# Patient Record
Sex: FEMALE | Race: WHITE | NOT HISPANIC OR LATINO | Employment: OTHER | URBAN - NONMETROPOLITAN AREA
[De-identification: names, ages, dates, MRNs, and addresses within clinical notes are randomized per-mention and may not be internally consistent; named-entity substitution may affect disease eponyms.]

---

## 2023-08-17 NOTE — PROGRESS NOTES
Krystin Mendoza is a 68 y.o. female who presents for Establish Care (No concerns. )    She is dealing with burping for 4 month. No nausea, vomiting. No diarrhea or constipation.     Left shoulder arthritis: Following with Dr. Gilman. Doing PT, did well with an injection.     Review of systems completed and unremarkable other than what is documented in HPI.    Social history: non-smoker, she drinks a glass of red wine per day, she is retired from work at a title  Medical history: weight loss  Medications: None, vitamins  SurgHx: h/o surgery for inguinal hernia and bowel obstruction (Dr. Hinton 2021), h/o left wrist surgery (fracture)  Fhx: mom had DM  Allergies: PCN    Objective   There were no vitals taken for this visit.    Gen: No acute distress, alert and oriented x3, pleasant   HEENT: moist mucous membranes, b/l external auditory canals are clear of debris, TMs within normal limits, no oropharyngeal lesions, eomi, perrla   Neck: thyroid within normal limits, no lymphadenopathy   CV: RRR, normal S1/S2, no murmur   Resp: Clear to auscultation bilaterally, no wheezes or rhonchi appreciated  Abd: soft, nontender, non-distended, no guarding/rigidity, bowel sounds present  Extr: no edema, no calf tenderness  Derm: Skin is warm and dry, no rashes appreciated  Psych: mood is good, affect is congruent, good hygiene, normal speech and eye contact  Neuro: cranial nerves grossly intact, normal gait      Assessment/Plan     69yo female here to establish care:    #Unexplained weight loss  Likely due to stress which has improved  Regaining weight  Monitor    #Shoulder pain  Following with Mukul   Doing well since injection    #Burping  Working on changing her calcium supplement  Recent ct chest negative for hiatal hernia  Discussed warning symptoms for GERD and she is declining currently    HCM:  Mammogram June negative  UTD for PNA, shingles,COVID and flu  Last C-scope 2015, discuss at followup

## 2023-08-18 PROBLEM — I10 ESSENTIAL (PRIMARY) HYPERTENSION: Status: ACTIVE | Noted: 2022-03-12

## 2023-08-18 PROBLEM — F41.9 ANXIETY DISORDER, UNSPECIFIED: Status: ACTIVE | Noted: 2023-01-09

## 2023-08-18 PROBLEM — E03.9 HYPOTHYROIDISM, UNSPECIFIED: Status: ACTIVE | Noted: 2023-02-26

## 2023-08-18 PROBLEM — K46.9 ABDOMINAL HERNIA: Status: ACTIVE | Noted: 2022-03-12

## 2023-08-24 RX ORDER — MULTIVITAMIN
TABLET ORAL
COMMUNITY

## 2023-08-24 RX ORDER — BISACODYL 5 MG/1
1 TABLET, COATED ORAL
COMMUNITY

## 2023-08-24 RX ORDER — SERTRALINE HYDROCHLORIDE 25 MG/1
1 TABLET, FILM COATED ORAL DAILY
COMMUNITY
End: 2023-08-25 | Stop reason: ALTCHOICE

## 2023-08-25 ENCOUNTER — OFFICE VISIT (OUTPATIENT)
Dept: PRIMARY CARE | Facility: CLINIC | Age: 68
End: 2023-08-25
Payer: MEDICARE

## 2023-08-25 VITALS
DIASTOLIC BLOOD PRESSURE: 82 MMHG | BODY MASS INDEX: 17.38 KG/M2 | HEIGHT: 64 IN | HEART RATE: 79 BPM | SYSTOLIC BLOOD PRESSURE: 140 MMHG | WEIGHT: 101.8 LBS

## 2023-08-25 DIAGNOSIS — R73.09 ELEVATED GLUCOSE: Primary | ICD-10-CM

## 2023-08-25 DIAGNOSIS — Z13.220 SCREENING FOR HYPERLIPIDEMIA: ICD-10-CM

## 2023-08-25 PROCEDURE — 3077F SYST BP >= 140 MM HG: CPT | Performed by: FAMILY MEDICINE

## 2023-08-25 PROCEDURE — 99204 OFFICE O/P NEW MOD 45 MIN: CPT | Performed by: FAMILY MEDICINE

## 2023-08-25 PROCEDURE — 3079F DIAST BP 80-89 MM HG: CPT | Performed by: FAMILY MEDICINE

## 2023-08-25 PROCEDURE — 1036F TOBACCO NON-USER: CPT | Performed by: FAMILY MEDICINE

## 2023-08-25 PROCEDURE — 1159F MED LIST DOCD IN RCRD: CPT | Performed by: FAMILY MEDICINE

## 2023-08-25 PROCEDURE — 3008F BODY MASS INDEX DOCD: CPT | Performed by: FAMILY MEDICINE

## 2023-08-25 RX ORDER — GARLIC 1000 MG
CAPSULE ORAL
COMMUNITY

## 2023-08-25 RX ORDER — ASPIRIN 81 MG/1
81 TABLET ORAL DAILY
COMMUNITY

## 2023-10-20 ENCOUNTER — APPOINTMENT (OUTPATIENT)
Dept: RADIOLOGY | Facility: HOSPITAL | Age: 68
End: 2023-10-20
Payer: MEDICARE

## 2023-10-20 ENCOUNTER — HOSPITAL ENCOUNTER (EMERGENCY)
Facility: HOSPITAL | Age: 68
Discharge: HOME | End: 2023-10-20
Attending: EMERGENCY MEDICINE
Payer: MEDICARE

## 2023-10-20 VITALS
TEMPERATURE: 98.6 F | HEART RATE: 78 BPM | DIASTOLIC BLOOD PRESSURE: 79 MMHG | OXYGEN SATURATION: 98 % | WEIGHT: 105.82 LBS | SYSTOLIC BLOOD PRESSURE: 164 MMHG | RESPIRATION RATE: 18 BRPM | BODY MASS INDEX: 18.07 KG/M2 | HEIGHT: 64 IN

## 2023-10-20 DIAGNOSIS — R10.32 LEFT LOWER QUADRANT ABDOMINAL PAIN: ICD-10-CM

## 2023-10-20 DIAGNOSIS — E87.1 HYPONATREMIA: Primary | ICD-10-CM

## 2023-10-20 LAB
ALBUMIN SERPL BCP-MCNC: 4.8 G/DL (ref 3.4–5)
ALP SERPL-CCNC: 73 U/L (ref 33–136)
ALT SERPL W P-5'-P-CCNC: 15 U/L (ref 7–45)
ANION GAP SERPL CALC-SCNC: 13 MMOL/L (ref 10–20)
APPEARANCE UR: CLEAR
AST SERPL W P-5'-P-CCNC: 26 U/L (ref 9–39)
BASOPHILS # BLD AUTO: 0.04 X10*3/UL (ref 0–0.1)
BASOPHILS NFR BLD AUTO: 0.5 %
BILIRUB SERPL-MCNC: 0.5 MG/DL (ref 0–1.2)
BILIRUB UR STRIP.AUTO-MCNC: NEGATIVE MG/DL
BUN SERPL-MCNC: 7 MG/DL (ref 6–23)
CALCIUM SERPL-MCNC: 9.8 MG/DL (ref 8.6–10.3)
CHLORIDE SERPL-SCNC: 93 MMOL/L (ref 98–107)
CO2 SERPL-SCNC: 26 MMOL/L (ref 21–32)
COLOR UR: ABNORMAL
CREAT SERPL-MCNC: 0.62 MG/DL (ref 0.5–1.05)
EOSINOPHIL # BLD AUTO: 0.05 X10*3/UL (ref 0–0.7)
EOSINOPHIL NFR BLD AUTO: 0.7 %
ERYTHROCYTE [DISTWIDTH] IN BLOOD BY AUTOMATED COUNT: 12.4 % (ref 11.5–14.5)
GFR SERPL CREATININE-BSD FRML MDRD: >90 ML/MIN/1.73M*2
GLUCOSE SERPL-MCNC: 114 MG/DL (ref 74–99)
GLUCOSE UR STRIP.AUTO-MCNC: NEGATIVE MG/DL
HCT VFR BLD AUTO: 46 % (ref 36–46)
HGB BLD-MCNC: 15.4 G/DL (ref 12–16)
HOLD SPECIMEN: NORMAL
IMM GRANULOCYTES # BLD AUTO: 0.03 X10*3/UL (ref 0–0.7)
IMM GRANULOCYTES NFR BLD AUTO: 0.4 % (ref 0–0.9)
KETONES UR STRIP.AUTO-MCNC: NEGATIVE MG/DL
LEUKOCYTE ESTERASE UR QL STRIP.AUTO: NEGATIVE
LYMPHOCYTES # BLD AUTO: 1.48 X10*3/UL (ref 1.2–4.8)
LYMPHOCYTES NFR BLD AUTO: 19.3 %
MAGNESIUM SERPL-MCNC: 2.16 MG/DL (ref 1.6–2.4)
MCH RBC QN AUTO: 31.6 PG (ref 26–34)
MCHC RBC AUTO-ENTMCNC: 33.5 G/DL (ref 32–36)
MCV RBC AUTO: 95 FL (ref 80–100)
MONOCYTES # BLD AUTO: 0.82 X10*3/UL (ref 0.1–1)
MONOCYTES NFR BLD AUTO: 10.7 %
NEUTROPHILS # BLD AUTO: 5.25 X10*3/UL (ref 1.2–7.7)
NEUTROPHILS NFR BLD AUTO: 68.4 %
NITRITE UR QL STRIP.AUTO: NEGATIVE
NRBC BLD-RTO: 0 /100 WBCS (ref 0–0)
PH UR STRIP.AUTO: 7 [PH]
PLATELET # BLD AUTO: 243 X10*3/UL (ref 150–450)
PMV BLD AUTO: 8.5 FL (ref 7.5–11.5)
POTASSIUM SERPL-SCNC: 3.8 MMOL/L (ref 3.5–5.3)
PROT SERPL-MCNC: 8.3 G/DL (ref 6.4–8.2)
PROT UR STRIP.AUTO-MCNC: NEGATIVE MG/DL
RBC # BLD AUTO: 4.87 X10*6/UL (ref 4–5.2)
RBC # UR STRIP.AUTO: ABNORMAL /UL
RBC #/AREA URNS AUTO: NORMAL /HPF
SODIUM SERPL-SCNC: 128 MMOL/L (ref 136–145)
SP GR UR STRIP.AUTO: 1
SQUAMOUS #/AREA URNS AUTO: NORMAL /HPF
UROBILINOGEN UR STRIP.AUTO-MCNC: <2 MG/DL
WBC # BLD AUTO: 7.7 X10*3/UL (ref 4.4–11.3)
WBC #/AREA URNS AUTO: NORMAL /HPF

## 2023-10-20 PROCEDURE — 2550000001 HC RX 255 CONTRASTS: Performed by: EMERGENCY MEDICINE

## 2023-10-20 PROCEDURE — 96360 HYDRATION IV INFUSION INIT: CPT

## 2023-10-20 PROCEDURE — 74177 CT ABD & PELVIS W/CONTRAST: CPT | Performed by: RADIOLOGY

## 2023-10-20 PROCEDURE — 36415 COLL VENOUS BLD VENIPUNCTURE: CPT | Performed by: EMERGENCY MEDICINE

## 2023-10-20 PROCEDURE — 85025 COMPLETE CBC W/AUTO DIFF WBC: CPT | Performed by: EMERGENCY MEDICINE

## 2023-10-20 PROCEDURE — 80053 COMPREHEN METABOLIC PANEL: CPT | Performed by: EMERGENCY MEDICINE

## 2023-10-20 PROCEDURE — 99284 EMERGENCY DEPT VISIT MOD MDM: CPT | Performed by: EMERGENCY MEDICINE

## 2023-10-20 PROCEDURE — 83735 ASSAY OF MAGNESIUM: CPT | Performed by: EMERGENCY MEDICINE

## 2023-10-20 PROCEDURE — 81001 URINALYSIS AUTO W/SCOPE: CPT | Performed by: EMERGENCY MEDICINE

## 2023-10-20 PROCEDURE — 74177 CT ABD & PELVIS W/CONTRAST: CPT

## 2023-10-20 PROCEDURE — 2500000004 HC RX 250 GENERAL PHARMACY W/ HCPCS (ALT 636 FOR OP/ED): Performed by: EMERGENCY MEDICINE

## 2023-10-20 PROCEDURE — 96361 HYDRATE IV INFUSION ADD-ON: CPT

## 2023-10-20 RX ADMIN — SODIUM CHLORIDE, POTASSIUM CHLORIDE, SODIUM LACTATE AND CALCIUM CHLORIDE 500 ML: 600; 310; 30; 20 INJECTION, SOLUTION INTRAVENOUS at 12:24

## 2023-10-20 RX ADMIN — IOHEXOL 72 ML: 350 INJECTION, SOLUTION INTRAVENOUS at 13:51

## 2023-10-20 ASSESSMENT — COLUMBIA-SUICIDE SEVERITY RATING SCALE - C-SSRS
6. HAVE YOU EVER DONE ANYTHING, STARTED TO DO ANYTHING, OR PREPARED TO DO ANYTHING TO END YOUR LIFE?: NO
2. HAVE YOU ACTUALLY HAD ANY THOUGHTS OF KILLING YOURSELF?: NO
1. IN THE PAST MONTH, HAVE YOU WISHED YOU WERE DEAD OR WISHED YOU COULD GO TO SLEEP AND NOT WAKE UP?: NO

## 2023-10-20 ASSESSMENT — PAIN DESCRIPTION - FREQUENCY: FREQUENCY: CONSTANT/CONTINUOUS

## 2023-10-20 ASSESSMENT — PAIN DESCRIPTION - ORIENTATION: ORIENTATION: LEFT;LOWER

## 2023-10-20 ASSESSMENT — PAIN SCALES - GENERAL: PAINLEVEL_OUTOF10: 2

## 2023-10-20 ASSESSMENT — PAIN - FUNCTIONAL ASSESSMENT: PAIN_FUNCTIONAL_ASSESSMENT: 0-10

## 2023-10-20 ASSESSMENT — PAIN DESCRIPTION - DESCRIPTORS: DESCRIPTORS: DISCOMFORT

## 2023-10-20 NOTE — ED PROVIDER NOTES
HPI   Chief Complaint   Patient presents with    Abdominal Pain     LLQ pain for approx 1 month.  No n/v/d.           History provided by:  Patient and spouse                      No data recorded                Patient History   Past Medical History:   Diagnosis Date    H/O hernia repair      History reviewed. No pertinent surgical history.  No family history on file.  Social History     Tobacco Use    Smoking status: Never    Smokeless tobacco: Never   Substance Use Topics    Alcohol use: Yes     Alcohol/week: 1.0 standard drink of alcohol     Types: 1 Glasses of wine per week    Drug use: Never       Physical Exam   ED Triage Vitals [10/20/23 1215]   Temp Heart Rate Resp BP   37 °C (98.6 °F) 104 16 (!) 192/104      SpO2 Temp Source Heart Rate Source Patient Position   99 % Temporal Monitor --      BP Location FiO2 (%)     -- --       Physical Exam  Constitutional:       General: She is not in acute distress.     Appearance: Normal appearance. She is not toxic-appearing.   HENT:      Head: Normocephalic and atraumatic.      Right Ear: Tympanic membrane normal.      Left Ear: Tympanic membrane normal.      Mouth/Throat:      Mouth: Mucous membranes are moist.      Pharynx: Oropharynx is clear.   Eyes:      Conjunctiva/sclera: Conjunctivae normal.      Pupils: Pupils are equal, round, and reactive to light.   Cardiovascular:      Rate and Rhythm: Normal rate and regular rhythm.      Pulses: Normal pulses.      Heart sounds: Normal heart sounds.   Pulmonary:      Effort: Pulmonary effort is normal. No respiratory distress.      Breath sounds: Normal breath sounds. No wheezing.   Abdominal:      General: Bowel sounds are normal.      Palpations: Abdomen is soft.      Tenderness: There is abdominal tenderness in the left lower quadrant. There is no guarding or rebound.   Musculoskeletal:         General: Normal range of motion.      Cervical back: Normal range of motion.   Skin:     General: Skin is warm and dry.    Neurological:      General: No focal deficit present.      Mental Status: She is alert and oriented to person, place, and time.         ED Course & MDM   ED Course as of 10/20/23 1611   Fri Oct 20, 2023   1455 SODIUM(!): 128  Abnormal sodium level patient with IV fluids here in the ED [KA]   1459 SODIUM(!): 128 [KA]      ED Course User Index  [KA] Braeden Richardson DO         Diagnoses as of 10/20/23 1611   Hyponatremia   Left lower quadrant abdominal pain       Medical Decision Making  76-year-old female presents to the ER with chief complaint of left lower quadrant pain.  Patient reports that she has been having pain for multiple months on the left lower quadrant came to the ED for concern.  Patient denies any vomiting denies any diarrhea.  Patient says she does come and go slightly but is bothered her some so this reason came to the ED for evaluation.  Patient denies any fever or chills.  CAT scan did not find any emergent findings.  Plan is to discharge home however did find a slightly abnormal sodium.  Consulted internal medicine their standpoint they recommend discharging with close follow-up.  At that point I did reach out to Dr. Richard who is her PCP who will reach out to her Monday for outpatient work-up.  I did talk to her on the phone regarding this finding.        Procedure  Procedures     Braeden Richardson DO  10/20/23 1611

## 2023-10-23 ASSESSMENT — ENCOUNTER SYMPTOMS
ANOREXIA: 0
HEMATURIA: 0
VOMITING: 0
DIARRHEA: 0
FLATUS: 1
HEMATOCHEZIA: 0
WEIGHT LOSS: 0
FEVER: 0
DYSURIA: 0
MYALGIAS: 0
FREQUENCY: 0
HEADACHES: 0
BELCHING: 1
ARTHRALGIAS: 0
NAUSEA: 0
ABDOMINAL PAIN: 1
CONSTIPATION: 0

## 2023-10-24 NOTE — PROGRESS NOTES
"Krystin Mendoza is a 68 y.o. female who presents for ER Follow-up (Uncomfortable sensation in LLQ, not excruciating, burping a lot; normal stools; working on gaining weight since hernia surgery; eats 6 times a day, no issue with appetite)    LLQ pain,hyponatremia: When she came in here two months ago we discussed frequent burping. Over the course of the last two months she developed a \"twitter\" in the left lower quadrant.The burping has gotten much worse, all day, not associated with diet. She is gaining weight since her surgery 1 1/2 years ago. Exercising more over time. She has concerns that it  could be associated with her hernia surgery. No fevers. Strength improving over time as well. She is questioning about her hormones, if that could be contributing. Feeling full a lot. Rare symptoms of heartburn, she takes a tums once in a while. She is drinking more gatorade now to help with her electrolytes/sodium.    She is dealing with burping for 4 month. No nausea, vomiting. No diarrhea or constipation.      Left shoulder arthritis: Following with Dr. Gilman. Doing PT, did well with an injection.      Review of systems completed and unremarkable other than what is documented in HPI.    Objective   BP (!) 180/104 (BP Location: Left arm, Patient Position: Sitting, BP Cuff Size: Adult)   Pulse 93   Ht 1.626 m (5' 4\")   Wt 48.2 kg (106 lb 3.2 oz)   BMI 18.23 kg/m²     Gen: No acute distress, alert and oriented x3, pleasant   HEENT: moist mucous membranes, b/l external auditory canals are clear of debris, TMs within normal limits, no oropharyngeal lesions, eomi, perrla   Neck: thyroid within normal limits, no lymphadenopathy   CV: RRR, normal S1/S2, no murmur   Resp: Clear to auscultation bilaterally, no wheezes or rhonchi appreciated  Abd: soft, nontender, non-distended, no guarding/rigidity, bowel sounds present  Extr: no edema, no calf tenderness  Derm: Skin is warm and dry, no rashes appreciated  Psych: mood is " good, affect is congruent, good hygiene, normal speech and eye contact  Neuro: cranial nerves grossly intact, normal gait    Assessment/Plan     #Eurctation  #Hyponatremia  #LLQ pain  CT scan negative  We discussed ddx which includes ovarian pathology, scar tissue from hernia surgery, muscle spasm vs PUD  Check labs  Trial muscle relaxer  Declining to trial PPI  Considering TVUS  Followup 1 mo to discuss    #Hyponatremia  Work on sodium intake  Recheck labs    #Unexplained weight loss  Likely due to stress which has improved  Regaining weight  Monitor     #Shoulder pain  Following with Ivettnldarvin   Doing well since injection     #Burping  Working on changing her calcium supplement  Recent ct chest negative for hiatal hernia  Discussed warning symptoms for GERD and she is declining currently     HCM:  Mammogram June negative  UTD for PNA, shingles,COVID and flu  Last C-scope 2015, discuss at followup     Answers submitted by the patient for this visit:  Abdominal Pain Questionnaire (Submitted on 10/23/2023)  Chief Complaint: Abdominal pain  Chronicity: new  Onset: more than 1 month ago  Onset quality: gradual  Frequency: constantly  Progression since onset: gradually worsening  Pain location: LLQ  Pain - numeric: 2/10  Pain quality: dull  Radiates to: epigastric region  anorexia: No  arthralgias: No  belching: Yes  constipation: No  diarrhea: No  dysuria: No  fever: No  flatus: Yes  frequency: No  headaches: No  hematochezia: No  hematuria: No  melena: No  myalgias: No  nausea: No  weight loss: No  vomiting: No  Aggravated by: nothing  Relieved by: nothing  Diagnostic workup: CT scan

## 2023-10-25 ENCOUNTER — OFFICE VISIT (OUTPATIENT)
Dept: PRIMARY CARE | Facility: CLINIC | Age: 68
End: 2023-10-25
Payer: MEDICARE

## 2023-10-25 VITALS
BODY MASS INDEX: 18.13 KG/M2 | DIASTOLIC BLOOD PRESSURE: 104 MMHG | HEIGHT: 64 IN | HEART RATE: 93 BPM | WEIGHT: 106.2 LBS | SYSTOLIC BLOOD PRESSURE: 180 MMHG

## 2023-10-25 DIAGNOSIS — R10.2 PELVIC PAIN: Primary | ICD-10-CM

## 2023-10-25 DIAGNOSIS — R53.83 DIFFICULTY EATING DUE TO FATIGUE: ICD-10-CM

## 2023-10-25 DIAGNOSIS — R10.32 LEFT LOWER QUADRANT PAIN: ICD-10-CM

## 2023-10-25 DIAGNOSIS — R63.8 DIFFICULTY EATING DUE TO FATIGUE: ICD-10-CM

## 2023-10-25 PROBLEM — M47.812 SPONDYLOSIS OF CERVICAL REGION WITHOUT MYELOPATHY OR RADICULOPATHY: Status: ACTIVE | Noted: 2023-07-10

## 2023-10-25 PROBLEM — R64 CACHEXIA (MULTI): Status: ACTIVE | Noted: 2023-07-10

## 2023-10-25 PROCEDURE — 1125F AMNT PAIN NOTED PAIN PRSNT: CPT | Performed by: FAMILY MEDICINE

## 2023-10-25 PROCEDURE — 1159F MED LIST DOCD IN RCRD: CPT | Performed by: FAMILY MEDICINE

## 2023-10-25 PROCEDURE — 99214 OFFICE O/P EST MOD 30 MIN: CPT | Performed by: FAMILY MEDICINE

## 2023-10-25 PROCEDURE — 3008F BODY MASS INDEX DOCD: CPT | Performed by: FAMILY MEDICINE

## 2023-10-25 PROCEDURE — 3080F DIAST BP >= 90 MM HG: CPT | Performed by: FAMILY MEDICINE

## 2023-10-25 PROCEDURE — 3077F SYST BP >= 140 MM HG: CPT | Performed by: FAMILY MEDICINE

## 2023-10-25 PROCEDURE — 1036F TOBACCO NON-USER: CPT | Performed by: FAMILY MEDICINE

## 2023-10-25 RX ORDER — BACLOFEN 10 MG/1
10 TABLET ORAL 2 TIMES DAILY
Qty: 60 TABLET | Refills: 5 | Status: SHIPPED | OUTPATIENT
Start: 2023-10-25 | End: 2023-11-22 | Stop reason: SDUPTHER

## 2023-10-26 ENCOUNTER — LAB (OUTPATIENT)
Dept: LAB | Facility: LAB | Age: 68
End: 2023-10-26
Payer: MEDICARE

## 2023-10-26 DIAGNOSIS — R10.2 PELVIC PAIN: ICD-10-CM

## 2023-10-26 DIAGNOSIS — R63.8 DIFFICULTY EATING DUE TO FATIGUE: ICD-10-CM

## 2023-10-26 DIAGNOSIS — R53.83 DIFFICULTY EATING DUE TO FATIGUE: ICD-10-CM

## 2023-10-26 LAB
ANION GAP SERPL CALC-SCNC: 12 MMOL/L (ref 10–20)
BASOPHILS # BLD AUTO: 0.06 X10*3/UL (ref 0–0.1)
BASOPHILS NFR BLD AUTO: 0.8 %
BUN SERPL-MCNC: 7 MG/DL (ref 6–23)
CALCIUM SERPL-MCNC: 10 MG/DL (ref 8.6–10.3)
CHLORIDE SERPL-SCNC: 94 MMOL/L (ref 98–107)
CO2 SERPL-SCNC: 30 MMOL/L (ref 21–32)
CREAT SERPL-MCNC: 0.61 MG/DL (ref 0.5–1.05)
EOSINOPHIL # BLD AUTO: 0.08 X10*3/UL (ref 0–0.7)
EOSINOPHIL NFR BLD AUTO: 1 %
ERYTHROCYTE [DISTWIDTH] IN BLOOD BY AUTOMATED COUNT: 12.8 % (ref 11.5–14.5)
GFR SERPL CREATININE-BSD FRML MDRD: >90 ML/MIN/1.73M*2
GLUCOSE SERPL-MCNC: 122 MG/DL (ref 74–99)
HCT VFR BLD AUTO: 44.1 % (ref 36–46)
HGB BLD-MCNC: 14.7 G/DL (ref 12–16)
IMM GRANULOCYTES # BLD AUTO: 0.02 X10*3/UL (ref 0–0.7)
IMM GRANULOCYTES NFR BLD AUTO: 0.3 % (ref 0–0.9)
IRON SERPL-MCNC: 119 UG/DL (ref 35–150)
LYMPHOCYTES # BLD AUTO: 1.52 X10*3/UL (ref 1.2–4.8)
LYMPHOCYTES NFR BLD AUTO: 19.1 %
MCH RBC QN AUTO: 31.7 PG (ref 26–34)
MCHC RBC AUTO-ENTMCNC: 33.3 G/DL (ref 32–36)
MCV RBC AUTO: 95 FL (ref 80–100)
MONOCYTES # BLD AUTO: 0.79 X10*3/UL (ref 0.1–1)
MONOCYTES NFR BLD AUTO: 9.9 %
NEUTROPHILS # BLD AUTO: 5.48 X10*3/UL (ref 1.2–7.7)
NEUTROPHILS NFR BLD AUTO: 68.9 %
NRBC BLD-RTO: 0 /100 WBCS (ref 0–0)
PLATELET # BLD AUTO: 263 X10*3/UL (ref 150–450)
PMV BLD AUTO: 9.1 FL (ref 7.5–11.5)
POTASSIUM SERPL-SCNC: 4.3 MMOL/L (ref 3.5–5.3)
RBC # BLD AUTO: 4.64 X10*6/UL (ref 4–5.2)
SODIUM SERPL-SCNC: 132 MMOL/L (ref 136–145)
TSH SERPL-ACNC: 3.91 MIU/L (ref 0.44–3.98)
WBC # BLD AUTO: 8 X10*3/UL (ref 4.4–11.3)

## 2023-10-26 PROCEDURE — 84443 ASSAY THYROID STIM HORMONE: CPT

## 2023-10-26 PROCEDURE — 82672 ASSAY OF ESTROGEN: CPT

## 2023-10-26 PROCEDURE — 36415 COLL VENOUS BLD VENIPUNCTURE: CPT

## 2023-10-26 PROCEDURE — 80048 BASIC METABOLIC PNL TOTAL CA: CPT

## 2023-10-26 PROCEDURE — 83001 ASSAY OF GONADOTROPIN (FSH): CPT

## 2023-10-26 PROCEDURE — 85025 COMPLETE CBC W/AUTO DIFF WBC: CPT

## 2023-10-26 PROCEDURE — 83002 ASSAY OF GONADOTROPIN (LH): CPT

## 2023-10-26 PROCEDURE — 83540 ASSAY OF IRON: CPT

## 2023-10-27 LAB
FSH SERPL-ACNC: 84.5 IU/L
LH SERPL-ACNC: 37.6 IU/L

## 2023-11-02 LAB — ESTROGEN SERPL-MCNC: 44 PG/ML (ref 40–244)

## 2023-11-13 ENCOUNTER — HOSPITAL ENCOUNTER (OUTPATIENT)
Dept: RADIOLOGY | Facility: HOSPITAL | Age: 68
Discharge: HOME | End: 2023-11-13
Payer: MEDICARE

## 2023-11-13 DIAGNOSIS — R10.2 PELVIC PAIN: ICD-10-CM

## 2023-11-13 PROCEDURE — 76830 TRANSVAGINAL US NON-OB: CPT

## 2023-11-13 PROCEDURE — 76830 TRANSVAGINAL US NON-OB: CPT | Performed by: RADIOLOGY

## 2023-11-13 PROCEDURE — 76856 US EXAM PELVIC COMPLETE: CPT | Performed by: RADIOLOGY

## 2023-11-14 NOTE — PROGRESS NOTES
"Krystin Mendoza is a 68 y.o. female who presents for Follow-up (Abd pain; no change in condition)    LLQ pain, hyponatremia: Frequent burping, LLQ \"twitters.: Going on for about 6 mo now. Had hernia repair about 2y ago. Having some challenges with appetite and early fullness. Completed recent testing. No vaginal bleeding but has questions about our plan and some interest in trying simethicone.      Left shoulder arthritis: Following with Dr. Gilman. Doing PT, did well with an injection.      Review of systems completed and unremarkable other than what is documented in HPI.    Objective   BP (!) 174/91 (BP Location: Left arm, Patient Position: Sitting, BP Cuff Size: Adult)   Pulse 93   Ht 1.626 m (5' 4\")   Wt 48 kg (105 lb 12.8 oz)   BMI 18.16 kg/m²     Gen: No acute distress, alert and oriented x3, pleasant   HEENT: moist mucous membranes, b/l external auditory canals are clear of debris, TMs within normal limits, no oropharyngeal lesions, eomi, perrla   Neck: thyroid within normal limits, no lymphadenopathy   CV: RRR, normal S1/S2, no murmur   Resp: Clear to auscultation bilaterally, no wheezes or rhonchi appreciated  Abd: soft, nontender, non-distended, no guarding/rigidity, bowel sounds present  Extr: no edema, no calf tenderness  Derm: Skin is warm and dry, no rashes appreciated  Psych: mood is good, affect is congruent, good hygiene, normal speech and eye contact  Neuro: cranial nerves grossly intact, normal gait    Assessment/Plan     #Eurctation  #Hyponatremia  #LLQ pain  CT scan negative  TVUS without ovarian pathology but some changes in the uterus  We discussed ddx which includes ovarian pathology, scar tissue from hernia surgery, muscle spasm vs PUD  Labs were low  Muscle relaxer has been helpful  Declining to trial PPI  Can try simethicone or low FODMAP diet  Re-discuss in 1 to 2 mo     #Hyponatremia  Work on sodium intake  Recheck labs     #Unexplained weight loss  Likely due to stress which has " improved  Regaining weight  Monitor     #Shoulder pain  Following with Mukul   Doing well since injection     #Burping  Working on changing her calcium supplement  Recent ct chest negative for hiatal hernia  Discussed warning symptoms for GERD and she is declining currently     HCM:  Mammogram June negative  UTD for PNA, shingles,COVID and flu  Planning for RSv  Last C-scope 2015, discuss at followup    Answers submitted by the patient for this visit:  Abdominal Pain Questionnaire (Submitted on 11/20/2023)  Chief Complaint: Abdominal pain  Progression since onset: waxing and waning  Pain location: generalized abdominal region  Pain - numeric: 2/10  Pain quality: dull  Radiates to: LLQ, epigastric region, pelvis  anorexia: No  arthralgias: No  belching: Yes  constipation: No  diarrhea: No  dysuria: No  fever: No  flatus: Yes  frequency: No  headaches: No  hematochezia: No  hematuria: No  melena: No  myalgias: No  nausea: No  weight loss: No  vomiting: No  Aggravated by: nothing  Relieved by: nothing  Diagnostic workup: CT scan, surgery, ultrasound

## 2023-11-20 ASSESSMENT — ENCOUNTER SYMPTOMS
FEVER: 0
HEADACHES: 0
HEMATOCHEZIA: 0
ARTHRALGIAS: 0
ANOREXIA: 0
CONSTIPATION: 0
NAUSEA: 0
DIARRHEA: 0
FREQUENCY: 0
ABDOMINAL PAIN: 1
FLATUS: 1
WEIGHT LOSS: 0
VOMITING: 0
DYSURIA: 0
HEMATURIA: 0
BELCHING: 1
MYALGIAS: 0

## 2023-11-22 ENCOUNTER — OFFICE VISIT (OUTPATIENT)
Dept: PRIMARY CARE | Facility: CLINIC | Age: 68
End: 2023-11-22
Payer: MEDICARE

## 2023-11-22 VITALS
HEART RATE: 93 BPM | SYSTOLIC BLOOD PRESSURE: 174 MMHG | WEIGHT: 105.8 LBS | BODY MASS INDEX: 18.06 KG/M2 | DIASTOLIC BLOOD PRESSURE: 91 MMHG | HEIGHT: 64 IN

## 2023-11-22 DIAGNOSIS — R10.32 LEFT LOWER QUADRANT PAIN: ICD-10-CM

## 2023-11-22 PROCEDURE — 3080F DIAST BP >= 90 MM HG: CPT | Performed by: FAMILY MEDICINE

## 2023-11-22 PROCEDURE — 1159F MED LIST DOCD IN RCRD: CPT | Performed by: FAMILY MEDICINE

## 2023-11-22 PROCEDURE — 1125F AMNT PAIN NOTED PAIN PRSNT: CPT | Performed by: FAMILY MEDICINE

## 2023-11-22 PROCEDURE — 1036F TOBACCO NON-USER: CPT | Performed by: FAMILY MEDICINE

## 2023-11-22 PROCEDURE — 3008F BODY MASS INDEX DOCD: CPT | Performed by: FAMILY MEDICINE

## 2023-11-22 PROCEDURE — 3077F SYST BP >= 140 MM HG: CPT | Performed by: FAMILY MEDICINE

## 2023-11-22 PROCEDURE — 99214 OFFICE O/P EST MOD 30 MIN: CPT | Performed by: FAMILY MEDICINE

## 2023-11-22 RX ORDER — BACLOFEN 10 MG/1
10 TABLET ORAL 2 TIMES DAILY
Qty: 60 TABLET | Refills: 5 | Status: SHIPPED | OUTPATIENT
Start: 2023-11-22 | End: 2024-05-20

## 2024-02-13 NOTE — PROGRESS NOTES
"Krystin Mendoza is a 68 y.o. female who presents for Medicare Annual Wellness Visit Subsequent (Anxiety/down type mood feelings/overwhelmed mind/ vertigo symptoms because of dealing with health issues/continued lack of understanding /accepting /dealing with issues/frustration)    Anxiety: Regarding health issues. Very much intensified in the last 2 mo. She is getting anxiety attacks. She exercises regularly. Racing thoughts. No significant insomnia. Some feeling down/depressed. She is getting vertigo symptoms as well. Easily frustrated and overwhelmed. She had her first episode of vertigo over a year ago. She has been diagnosed with TMJ, tinnitus, and upper register hearing loss. She is dealing with worse than average sinus and head pressure and brain fog, had workup with ENT. Has not had improvement in left ear plugging. She was initially found to have fluid behind the ear drum but that resolved. She has tried steroids twice, exercises for TMJ arthritis. Has not tried antibiotics. She has been on buspirone in the past and that was helpful.    LLQ pain, hyponatremia: Frequent burping, LLQ \"twitters.: Going on for about 6 mo now. Had hernia repair about 2y ago. Having some challenges with appetite and early fullness. Completed recent testing. No vaginal bleeding but has questions about our plan and some interest in trying simethicone. She was taking baclofen but got dizziness during the day so she has been taking 1/2 tab at bedtime.      Left shoulder arthritis: Following with Dr. Gilman. Doing PT, did well with an injection.      Review of systems completed and unremarkable other than what is documented in HPI.    Objective   /90 (BP Location: Left arm, Patient Position: Sitting, BP Cuff Size: Small adult)   Pulse 91   Ht 1.626 m (5' 4\")   Wt 49.9 kg (110 lb)   BMI 18.88 kg/m²     Gen: No acute distress, alert and oriented x3, pleasant   HEENT: moist mucous membranes, b/l external auditory canals are " clear of debris, TMs within normal limits, no oropharyngeal lesions, eomi, perrla, decreased left ear TM, +effusion  Neck: thyroid within normal limits, no lymphadenopathy   CV: RRR, normal S1/S2, no murmur   Resp: Clear to auscultation bilaterally, no wheezes or rhonchi appreciated  Abd: soft, nontender, non-distended, no guarding/rigidity, bowel sounds present  Extr: no edema, no calf tenderness  Derm: Skin is warm and dry, no rashes appreciated  Psych: mood is good, affect is congruent, good hygiene, normal speech and eye contact  Neuro: cranial nerves grossly intact, normal gait    Assessment/Plan     #Anxiety  Trial lexapro    #Eustachian tube dysfunction  #Vertigo  Trial keflex  Trial meclizine prn  Add on flonase    #Eructation  #Hyponatremia  #LLQ pain  CT scan negative  TVUS without ovarian pathology but some changes in the uterus  We discussed ddx which includes ovarian pathology, scar tissue from hernia surgery, muscle spasm vs PUD  Labs were low  Muscle relaxer has been helpful  Declining to trial PPI  Can try simethicone or low FODMAP diet  Re-discuss in 1 to 2 mo     #Hyponatremia  Work on sodium intake  Recheck labs     #Unexplained weight loss  Likely due to stress which has improved  Regaining weight  Monitor     #Shoulder pain  Following with Mukul   Doing well since injection     #Burping  Working on changing her calcium supplement  Recent ct chest negative for hiatal hernia  Discussed warning symptoms for GERD and she is declining currently     HCM:  Mammogram June negative  UTD for PNA, shingles,COVID and flu  Planning for RSv  Last C-scope 2015, discuss at followup

## 2024-02-19 ENCOUNTER — OFFICE VISIT (OUTPATIENT)
Dept: PRIMARY CARE | Facility: CLINIC | Age: 69
End: 2024-02-19
Payer: MEDICARE

## 2024-02-19 VITALS
HEIGHT: 64 IN | WEIGHT: 110 LBS | BODY MASS INDEX: 18.78 KG/M2 | SYSTOLIC BLOOD PRESSURE: 150 MMHG | DIASTOLIC BLOOD PRESSURE: 78 MMHG | HEART RATE: 91 BPM

## 2024-02-19 DIAGNOSIS — F41.9 ANXIETY: ICD-10-CM

## 2024-02-19 DIAGNOSIS — J01.90 ACUTE SINUSITIS, RECURRENCE NOT SPECIFIED, UNSPECIFIED LOCATION: Primary | ICD-10-CM

## 2024-02-19 DIAGNOSIS — R42 VERTIGO: ICD-10-CM

## 2024-02-19 PROCEDURE — 3008F BODY MASS INDEX DOCD: CPT | Performed by: FAMILY MEDICINE

## 2024-02-19 PROCEDURE — G0439 PPPS, SUBSEQ VISIT: HCPCS | Performed by: FAMILY MEDICINE

## 2024-02-19 PROCEDURE — 3077F SYST BP >= 140 MM HG: CPT | Performed by: FAMILY MEDICINE

## 2024-02-19 PROCEDURE — 1159F MED LIST DOCD IN RCRD: CPT | Performed by: FAMILY MEDICINE

## 2024-02-19 PROCEDURE — 1170F FXNL STATUS ASSESSED: CPT | Performed by: FAMILY MEDICINE

## 2024-02-19 PROCEDURE — 1158F ADVNC CARE PLAN TLK DOCD: CPT | Performed by: FAMILY MEDICINE

## 2024-02-19 PROCEDURE — 3078F DIAST BP <80 MM HG: CPT | Performed by: FAMILY MEDICINE

## 2024-02-19 PROCEDURE — 1123F ACP DISCUSS/DSCN MKR DOCD: CPT | Performed by: FAMILY MEDICINE

## 2024-02-19 PROCEDURE — 1125F AMNT PAIN NOTED PAIN PRSNT: CPT | Performed by: FAMILY MEDICINE

## 2024-02-19 PROCEDURE — 1036F TOBACCO NON-USER: CPT | Performed by: FAMILY MEDICINE

## 2024-02-19 RX ORDER — ESCITALOPRAM OXALATE 10 MG/1
10 TABLET ORAL DAILY
Qty: 30 TABLET | Refills: 5 | Status: SHIPPED | OUTPATIENT
Start: 2024-02-19 | End: 2024-04-01 | Stop reason: ALTCHOICE

## 2024-02-19 RX ORDER — CEPHALEXIN 500 MG/1
500 CAPSULE ORAL 2 TIMES DAILY
Qty: 20 CAPSULE | Refills: 0 | Status: SHIPPED | OUTPATIENT
Start: 2024-02-19 | End: 2024-02-29

## 2024-02-19 RX ORDER — MECLIZINE HYDROCHLORIDE 25 MG/1
25 TABLET ORAL 3 TIMES DAILY PRN
Qty: 30 TABLET | Refills: 11 | Status: SHIPPED | OUTPATIENT
Start: 2024-02-19 | End: 2025-02-18

## 2024-02-19 RX ORDER — FLUTICASONE FUROATE 27.5 UG/1
1 SPRAY, METERED NASAL
Qty: 10 G | Refills: 5 | Status: SHIPPED | OUTPATIENT
Start: 2024-02-19 | End: 2024-04-01 | Stop reason: SDUPTHER

## 2024-02-19 ASSESSMENT — PATIENT HEALTH QUESTIONNAIRE - PHQ9
SUM OF ALL RESPONSES TO PHQ9 QUESTIONS 1 AND 2: 2
10. IF YOU CHECKED OFF ANY PROBLEMS, HOW DIFFICULT HAVE THESE PROBLEMS MADE IT FOR YOU TO DO YOUR WORK, TAKE CARE OF THINGS AT HOME, OR GET ALONG WITH OTHER PEOPLE: SOMEWHAT DIFFICULT
1. LITTLE INTEREST OR PLEASURE IN DOING THINGS: SEVERAL DAYS
2. FEELING DOWN, DEPRESSED OR HOPELESS: SEVERAL DAYS

## 2024-02-19 ASSESSMENT — ACTIVITIES OF DAILY LIVING (ADL)
DOING_HOUSEWORK: INDEPENDENT
TAKING_MEDICATION: INDEPENDENT
MANAGING_FINANCES: INDEPENDENT
GROCERY_SHOPPING: INDEPENDENT
DRESSING: INDEPENDENT
BATHING: INDEPENDENT

## 2024-02-19 NOTE — PATIENT INSTRUCTIONS
Start with lexapro (escitalopram) at night for anxiety    Try antibiotics (keflex) after 2 or 3 days  Start flonase while you are on antibiotics    Try meclizine (antivert) as needed for dizziness

## 2024-02-20 ENCOUNTER — TELEPHONE (OUTPATIENT)
Dept: PRIMARY CARE | Facility: CLINIC | Age: 69
End: 2024-02-20
Payer: MEDICARE

## 2024-02-20 NOTE — TELEPHONE ENCOUNTER
"Krystin called in with concerns of Lexapro that she started last night, after taking it she did not sleep well. This morning she is feeling \"odd\" confused and out of it with a headache. She is wondering if she should continue taking this or not and she if feeling very concerned on how she is feeling. Please advise.  "

## 2024-02-20 NOTE — TELEPHONE ENCOUNTER
Those type of side effects will dissipate after a couple weeks. Would she be willing to cut the pill in half for a week or two?

## 2024-04-01 ENCOUNTER — OFFICE VISIT (OUTPATIENT)
Dept: PRIMARY CARE | Facility: CLINIC | Age: 69
End: 2024-04-01
Payer: MEDICARE

## 2024-04-01 VITALS
HEIGHT: 64 IN | SYSTOLIC BLOOD PRESSURE: 145 MMHG | WEIGHT: 107 LBS | DIASTOLIC BLOOD PRESSURE: 70 MMHG | BODY MASS INDEX: 18.27 KG/M2 | HEART RATE: 92 BPM

## 2024-04-01 DIAGNOSIS — J01.90 ACUTE SINUSITIS, RECURRENCE NOT SPECIFIED, UNSPECIFIED LOCATION: ICD-10-CM

## 2024-04-01 DIAGNOSIS — F41.9 ANXIETY DISORDER, UNSPECIFIED TYPE: Primary | ICD-10-CM

## 2024-04-01 PROCEDURE — 3008F BODY MASS INDEX DOCD: CPT | Performed by: FAMILY MEDICINE

## 2024-04-01 PROCEDURE — 1159F MED LIST DOCD IN RCRD: CPT | Performed by: FAMILY MEDICINE

## 2024-04-01 PROCEDURE — 3077F SYST BP >= 140 MM HG: CPT | Performed by: FAMILY MEDICINE

## 2024-04-01 PROCEDURE — 1123F ACP DISCUSS/DSCN MKR DOCD: CPT | Performed by: FAMILY MEDICINE

## 2024-04-01 PROCEDURE — 1036F TOBACCO NON-USER: CPT | Performed by: FAMILY MEDICINE

## 2024-04-01 PROCEDURE — 99214 OFFICE O/P EST MOD 30 MIN: CPT | Performed by: FAMILY MEDICINE

## 2024-04-01 PROCEDURE — 3078F DIAST BP <80 MM HG: CPT | Performed by: FAMILY MEDICINE

## 2024-04-01 RX ORDER — ESCITALOPRAM OXALATE 5 MG/1
7.5 TABLET ORAL DAILY
Qty: 45 TABLET | Refills: 1 | Status: SHIPPED | OUTPATIENT
Start: 2024-04-01 | End: 2024-05-20

## 2024-04-01 RX ORDER — FLUTICASONE FUROATE 27.5 UG/1
1 SPRAY, METERED NASAL
Qty: 10 G | Refills: 5 | Status: SHIPPED | OUTPATIENT
Start: 2024-04-01 | End: 2025-04-01

## 2024-04-01 NOTE — PROGRESS NOTES
"Krystin Mendoza is a 68 y.o. female who presents for Follow-up (6 weeks trial lexapro for anxiety- started it 3 weeks ago with 1/2 tab, a full tab \"did not agree\" with her it made her feel \"loopy and immobile\", she does see improvement with 1/2 tab; would like to try taking 1/2 in AM and 1/2 in PM; finished atb for ear and has been using Flonase daily, has only needed meclizine a few times, but she does feel like the dizziness coincides with anxiety; ear feels better and tinnitus is somewhat improved)    Anxiety: Regarding health issues. Very much intensified in the last 2 mo. She is getting anxiety attacks. She exercises regularly. Racing thoughts. No significant insomnia. Some feeling down/depressed. She is getting vertigo symptoms as well that coincide with intensified anxiety symptoms. Easily frustrated and overwhelmed. She had her first episode of vertigo over a year ago. She has been diagnosed with TMJ, tinnitus, and upper register hearing loss. She is dealing with worse than average sinus and head pressure and brain fog, had workup with ENT. Has not had improvement in left ear plugging. She was initially found to have fluid behind the ear drum but that resolved. She has tried steroids twice, exercises for TMJ arthritis. Has not tried antibiotics. She has been on buspirone in the past and that was helpful.    Taking lexapro. Initially felt loopy and \"immobile.\" She dropped down to 1/2 pill in the evening. Sleep has improved. Generalized anxiety has improved slightly. Racing thoughts have improved. Feeling down/depressed has improved. Able to function more. She is able to function a bit better. Less frustrated and overwhelmed. Still having triggered moments and getting thrown off.     Vertigo: Completed antibiotics (keflex) and she is still taking meclizine prn. It helps. Sinus seem better, pressure has improved a bit. Tinnitus is about the same. Dizziness and off balance feeling it improved slowly. She is " "taking nasal spray sometimes BID if she feels she needs it. Not getting any SE's.      LLQ pain, hyponatremia: Frequent burping, LLQ \"twitters.: Going on for about 6 mo now. Had hernia repair about 2y ago. Having some challenges with appetite and early fullness. Completed recent testing. No vaginal bleeding but has questions about our plan and some interest in trying simethicone. She was taking baclofen but got dizziness during the day so she has been taking 1/2 tab at bedtime.      Left shoulder arthritis: Following with Dr. Gilman. Doing PT, did well with an injection.      Review of systems completed and unremarkable other than what is documented in HPI.    Objective   /82 (BP Location: Left arm, Patient Position: Sitting, BP Cuff Size: Adult)   Pulse 92   Ht 1.626 m (5' 4\")   Wt 48.5 kg (107 lb)   BMI 18.37 kg/m²     Gen: No acute distress, alert and oriented x3, pleasant   HEENT: moist mucous membranes, b/l external auditory canals are clear of debris, TMs within normal limits, no oropharyngeal lesions, eomi, perrla   Neck: thyroid within normal limits, no lymphadenopathy   CV: RRR, normal S1/S2, no murmur   Resp: Clear to auscultation bilaterally, no wheezes or rhonchi appreciated  Abd: soft, nontender, non-distended, no guarding/rigidity, bowel sounds present  Extr: no edema, no calf tenderness  Derm: Skin is warm and dry, no rashes appreciated  Psych: mood is good, affect is congruent, good hygiene, normal speech and eye contact  Neuro: cranial nerves grossly intact, normal gait    Assessment/Plan     #Anxiety  Doing ok on extra low dose lexapro  Increasing to 7.5mg today     #Eustachian tube dysfunction  #Vertigo  Trial keflex  Trial meclizine prn  Add on flonase     #Eructation  #Hyponatremia  #LLQ pain  CT scan negative  TVUS without ovarian pathology but some changes in the uterus  We discussed ddx which includes ovarian pathology, scar tissue from hernia surgery, muscle spasm vs PUD  Labs " were normal  Muscle relaxer has been helpful  Declining to trial PPI  Can try simethicone or low FODMAP diet  Re-discuss in 1 to 2 mo     #Hyponatremia  Work on sodium intake  Recheck labs     #Unexplained weight loss  Likely due to stress which has improved  Regaining weight  Monitor     #Shoulder pain  Following with Mukul   Doing well since injection     #Burping  Working on changing her calcium supplement  Recent ct chest negative for hiatal hernia  Discussed warning symptoms for GERD and she is declining currently     HCM:  Mammogram June negative  UTD for PNA, shingles,COVID and flu  Planning for RSv  Last C-scope 2015, discuss at followup

## 2024-05-19 DIAGNOSIS — F41.9 ANXIETY DISORDER, UNSPECIFIED TYPE: ICD-10-CM

## 2024-05-20 RX ORDER — ESCITALOPRAM OXALATE 5 MG/1
7.5 TABLET ORAL DAILY
Qty: 45 TABLET | Refills: 1 | Status: SHIPPED | OUTPATIENT
Start: 2024-05-20

## 2024-05-28 NOTE — PROGRESS NOTES
"Krystin Mendoza is a 68 y.o. female who presents for No chief complaint on file.    Anxiety: Regarding health issues. Very much intensified in the last 2 mo. She is getting anxiety attacks. She exercises regularly. Racing thoughts. No significant insomnia. Some feeling down/depressed. She is getting vertigo symptoms as well that coincide with intensified anxiety symptoms. Easily frustrated and overwhelmed. She had her first episode of vertigo over a year ago. She has been diagnosed with TMJ, tinnitus, and upper register hearing loss. She is dealing with worse than average sinus and head pressure and brain fog, had workup with ENT. Has not had improvement in left ear plugging. She was initially found to have fluid behind the ear drum but that resolved. She has tried steroids twice, exercises for TMJ arthritis. Has not tried antibiotics. She has been on buspirone in the past and that was helpful.     Taking lexapro. Initially felt loopy and \"immobile.\" She dropped down to 1/2 pill in the evening. Sleep has improved. Generalized anxiety has improved slightly. Racing thoughts have improved. Feeling down/depressed has improved. Able to function more. She is able to function a bit better. Less frustrated and overwhelmed. Still having triggered moments and getting thrown off.      Vertigo: Completed antibiotics (keflex) and she is still taking meclizine prn. It helps. Sinus seem better, pressure has improved a bit. Tinnitus is about the same. Dizziness and off balance feeling it improved slowly. She is taking nasal spray sometimes BID if she feels she needs it. Not getting any SE's.      LLQ pain, hyponatremia: Frequent burping, LLQ \"twitters.: Going on for about 6 mo now. Had hernia repair about 2y ago. Having some challenges with appetite and early fullness. Completed recent testing. No vaginal bleeding but has questions about our plan and some interest in trying simethicone. She was taking baclofen but got dizziness " during the day so she has been taking 1/2 tab at bedtime.      Left shoulder arthritis: Following with Dr. Gilman. Doing PT, did well with an injection.      Review of systems completed and unremarkable other than what is documented in HPI.    Objective   There were no vitals taken for this visit.    Gen: No acute distress, alert and oriented x3, pleasant   HEENT: moist mucous membranes, b/l external auditory canals are clear of debris, TMs within normal limits, no oropharyngeal lesions, eomi, perrla   Neck: thyroid within normal limits, no lymphadenopathy   CV: RRR, normal S1/S2, no murmur   Resp: Clear to auscultation bilaterally, no wheezes or rhonchi appreciated  Abd: soft, nontender, non-distended, no guarding/rigidity, bowel sounds present  Extr: no edema, no calf tenderness  Derm: Skin is warm and dry, no rashes appreciated  Psych: mood is good, affect is congruent, good hygiene, normal speech and eye contact  Neuro: cranial nerves grossly intact, normal gait    Assessment/Plan     #Anxiety  Doing ok on extra low dose lexapro  Increasing to 7.5mg today     #Eustachian tube dysfunction  #Vertigo  Trial keflex  Trial meclizine prn  Add on flonase     #Eructation  #Hyponatremia  #LLQ pain  CT scan negative  TVUS without ovarian pathology but some changes in the uterus  We discussed ddx which includes ovarian pathology, scar tissue from hernia surgery, muscle spasm vs PUD  Labs were normal  Muscle relaxer has been helpful  Declining to trial PPI  Can try simethicone or low FODMAP diet  Re-discuss in 1 to 2 mo     #Hyponatremia  Work on sodium intake  Recheck labs     #Unexplained weight loss  Likely due to stress which has improved  Regaining weight  Monitor     #Shoulder pain  Following with Mukul   Doing well since injection     #Burping  Working on changing her calcium supplement  Recent ct chest negative for hiatal hernia  Discussed warning symptoms for GERD and she is declining currently      HCM:  Mammogram June negative  UTD for PNA, shingles,COVID and flu  Planning for RS[facility]  Last C-scope 2015, discuss at followup

## 2024-06-03 ENCOUNTER — APPOINTMENT (OUTPATIENT)
Dept: PRIMARY CARE | Facility: CLINIC | Age: 69
End: 2024-06-03
Payer: MEDICARE

## 2024-06-24 NOTE — PROGRESS NOTES
"Subjective   Patient ID: Krystin Mendoza is a 68 y.o. female who presents for Follow-up (2 months, feeling so much better).    Anxiety: Regarding health issues. Much intensified a few months ago. Now on higher dose of lexapro (7.5mg). Feeling much better, feels she is \"wonderful.\" She has worked up to the 10mg about 2 weeks ago. Sleeping better. Calmer. Some of her GI sx have improved, she is still burping, she is doing ok now on beano as needed and TUMs at lunchtime and dinnertime. Maintaining her weight. No further anxiety attacks. Vertigo sx have resolved. Back to normal activity levels. No SE's.      LLQ pain, hyponatremia: Frequent burping, LLQ \"twitters.: Going on for about 6 mo now. Had hernia repair about 2y ago. Having some challenges with appetite and early fullness. Completed recent testing. No vaginal bleeding but has questions about our plan and some interest in trying simethicone. She was taking baclofen but got dizziness during the day so she has been taking 1/2 tab at bedtime.      Left shoulder arthritis: Following with Dr. Gilman. Doing PT, did well with an injection.      Review of systems completed and unremarkable other than what is documented in HPI.    Objective   BP (!) 195/85 (BP Location: Left arm, Patient Position: Sitting, BP Cuff Size: Small adult)   Pulse 64   Ht 1.626 m (5' 4\")   Wt 48.1 kg (106 lb)   BMI 18.19 kg/m²     Gen: No acute distress, alert and oriented x3, pleasant   HEENT: moist mucous membranes, b/l external auditory canals are clear of debris, TMs within normal limits, no oropharyngeal lesions, eomi, perrla   Neck: thyroid within normal limits, no lymphadenopathy   CV: RRR, normal S1/S2, no murmur   Resp: Clear to auscultation bilaterally, no wheezes or rhonchi appreciated  Abd: soft, nontender, non-distended, no guarding/rigidity, bowel sounds present  Extr: no edema, no calf tenderness  Derm: Skin is warm and dry, no rashes appreciated  Psych: mood is good, " affect is congruent, good hygiene, normal speech and eye contact  Neuro: cranial nerves grossly intact, normal gait    Assessment/Plan   #Anxiety  Doing very well on lexapro, no SE's     #Eructation  #Hyponatremia  #LLQ pain  CT scan negative  TVUS without ovarian pathology but some changes in the uterus  We discussed ddx which includes ovarian pathology, scar tissue from hernia surgery, muscle spasm vs PUD  Labs were normal  Muscle relaxer has been helpful  Declining to trial PPI  Can try simethicone or low FODMAP diet  Re-discuss in 1 to 2 mo     #Hyponatremia  Work on sodium intake  Recheck labs     #Unexplained weight loss  Likely due to stress which has improved  Regaining weight  Monitor     #Shoulder pain  Following with Mukul   Doing well since injection     #Burping  Working on changing her calcium supplement  Recent ct chest negative for hiatal hernia  Discussed warning symptoms for GERD and she is declining currently     HCM:  Mammogram June '23 negative, will do every other year, due 2025  UTD for PNA, shingles, COVID, RSV and flu  Last C-scope 2015, discuss at followup  [facility] 2/19/24

## 2024-07-02 ENCOUNTER — APPOINTMENT (OUTPATIENT)
Dept: PRIMARY CARE | Facility: CLINIC | Age: 69
End: 2024-07-02
Payer: MEDICARE

## 2024-07-02 VITALS
HEIGHT: 64 IN | HEART RATE: 64 BPM | WEIGHT: 106 LBS | BODY MASS INDEX: 18.1 KG/M2 | DIASTOLIC BLOOD PRESSURE: 85 MMHG | SYSTOLIC BLOOD PRESSURE: 195 MMHG

## 2024-07-02 DIAGNOSIS — F41.9 ANXIETY DISORDER, UNSPECIFIED TYPE: Primary | ICD-10-CM

## 2024-07-02 DIAGNOSIS — Z00.00 HEALTHCARE MAINTENANCE: ICD-10-CM

## 2024-07-02 DIAGNOSIS — F41.9 ANXIETY AND DEPRESSION: ICD-10-CM

## 2024-07-02 DIAGNOSIS — R73.09 ELEVATED GLUCOSE: ICD-10-CM

## 2024-07-02 DIAGNOSIS — Z13.220 SCREENING FOR HYPERLIPIDEMIA: ICD-10-CM

## 2024-07-02 DIAGNOSIS — F32.A ANXIETY AND DEPRESSION: ICD-10-CM

## 2024-07-02 PROCEDURE — 1123F ACP DISCUSS/DSCN MKR DOCD: CPT | Performed by: FAMILY MEDICINE

## 2024-07-02 PROCEDURE — 3079F DIAST BP 80-89 MM HG: CPT | Performed by: FAMILY MEDICINE

## 2024-07-02 PROCEDURE — 99214 OFFICE O/P EST MOD 30 MIN: CPT | Performed by: FAMILY MEDICINE

## 2024-07-02 PROCEDURE — 90715 TDAP VACCINE 7 YRS/> IM: CPT | Performed by: FAMILY MEDICINE

## 2024-07-02 PROCEDURE — 90471 IMMUNIZATION ADMIN: CPT | Performed by: FAMILY MEDICINE

## 2024-07-02 PROCEDURE — 1036F TOBACCO NON-USER: CPT | Performed by: FAMILY MEDICINE

## 2024-07-02 PROCEDURE — 3077F SYST BP >= 140 MM HG: CPT | Performed by: FAMILY MEDICINE

## 2024-07-02 PROCEDURE — 1159F MED LIST DOCD IN RCRD: CPT | Performed by: FAMILY MEDICINE

## 2024-07-02 RX ORDER — ESCITALOPRAM OXALATE 10 MG/1
10 TABLET ORAL DAILY
Qty: 30 TABLET | Refills: 5 | Status: SHIPPED | OUTPATIENT
Start: 2024-07-02 | End: 2024-12-29

## 2024-08-26 ENCOUNTER — APPOINTMENT (OUTPATIENT)
Dept: PRIMARY CARE | Facility: CLINIC | Age: 69
End: 2024-08-26
Payer: MEDICARE

## 2024-09-30 ENCOUNTER — LAB (OUTPATIENT)
Dept: LAB | Facility: LAB | Age: 69
End: 2024-09-30
Payer: MEDICARE

## 2024-09-30 DIAGNOSIS — R73.09 ELEVATED GLUCOSE: ICD-10-CM

## 2024-09-30 DIAGNOSIS — Z13.220 SCREENING FOR HYPERLIPIDEMIA: ICD-10-CM

## 2024-09-30 DIAGNOSIS — F41.9 ANXIETY AND DEPRESSION: ICD-10-CM

## 2024-09-30 DIAGNOSIS — Z00.00 HEALTHCARE MAINTENANCE: ICD-10-CM

## 2024-09-30 DIAGNOSIS — F32.A ANXIETY AND DEPRESSION: ICD-10-CM

## 2024-09-30 LAB
ALBUMIN SERPL BCP-MCNC: 4.5 G/DL (ref 3.4–5)
ALP SERPL-CCNC: 56 U/L (ref 33–136)
ALT SERPL W P-5'-P-CCNC: 12 U/L (ref 7–45)
ANION GAP SERPL CALC-SCNC: 11 MMOL/L (ref 10–20)
AST SERPL W P-5'-P-CCNC: 21 U/L (ref 9–39)
BASOPHILS # BLD AUTO: 0.04 X10*3/UL (ref 0–0.1)
BASOPHILS NFR BLD AUTO: 1 %
BILIRUB SERPL-MCNC: 0.6 MG/DL (ref 0–1.2)
BUN SERPL-MCNC: 5 MG/DL (ref 6–23)
CALCIUM SERPL-MCNC: 9.7 MG/DL (ref 8.6–10.3)
CHLORIDE SERPL-SCNC: 93 MMOL/L (ref 98–107)
CHOLEST SERPL-MCNC: 231 MG/DL (ref 0–199)
CHOLESTEROL/HDL RATIO: 2.1
CO2 SERPL-SCNC: 30 MMOL/L (ref 21–32)
CREAT SERPL-MCNC: 0.68 MG/DL (ref 0.5–1.05)
EGFRCR SERPLBLD CKD-EPI 2021: >90 ML/MIN/1.73M*2
EOSINOPHIL # BLD AUTO: 0.07 X10*3/UL (ref 0–0.4)
EOSINOPHIL NFR BLD AUTO: 1.8 %
ERYTHROCYTE [DISTWIDTH] IN BLOOD BY AUTOMATED COUNT: 13.7 % (ref 11.5–14.5)
GLUCOSE SERPL-MCNC: 96 MG/DL (ref 74–99)
HCT VFR BLD AUTO: 44.4 % (ref 36–46)
HDLC SERPL-MCNC: 111 MG/DL
HGB BLD-MCNC: 14.8 G/DL (ref 12–16)
IMM GRANULOCYTES # BLD AUTO: 0.01 X10*3/UL (ref 0–0.5)
IMM GRANULOCYTES NFR BLD AUTO: 0.3 % (ref 0–0.9)
LDLC SERPL CALC-MCNC: 106 MG/DL
LYMPHOCYTES # BLD AUTO: 1.38 X10*3/UL (ref 0.8–3)
LYMPHOCYTES NFR BLD AUTO: 34.7 %
MCH RBC QN AUTO: 30.8 PG (ref 26–34)
MCHC RBC AUTO-ENTMCNC: 33.3 G/DL (ref 32–36)
MCV RBC AUTO: 92 FL (ref 80–100)
MONOCYTES # BLD AUTO: 0.57 X10*3/UL (ref 0.05–0.8)
MONOCYTES NFR BLD AUTO: 14.3 %
NEUTROPHILS # BLD AUTO: 1.91 X10*3/UL (ref 1.6–5.5)
NEUTROPHILS NFR BLD AUTO: 47.9 %
NON HDL CHOLESTEROL: 120 MG/DL (ref 0–149)
NRBC BLD-RTO: 0 /100 WBCS (ref 0–0)
PLATELET # BLD AUTO: 249 X10*3/UL (ref 150–450)
POTASSIUM SERPL-SCNC: 4.1 MMOL/L (ref 3.5–5.3)
PROT SERPL-MCNC: 7.3 G/DL (ref 6.4–8.2)
RBC # BLD AUTO: 4.81 X10*6/UL (ref 4–5.2)
SODIUM SERPL-SCNC: 130 MMOL/L (ref 136–145)
TRIGL SERPL-MCNC: 71 MG/DL (ref 0–149)
VLDL: 14 MG/DL (ref 0–40)
WBC # BLD AUTO: 4 X10*3/UL (ref 4.4–11.3)

## 2024-09-30 PROCEDURE — 36415 COLL VENOUS BLD VENIPUNCTURE: CPT

## 2024-10-01 LAB
EST. AVERAGE GLUCOSE BLD GHB EST-MCNC: 111 MG/DL
HBA1C MFR BLD: 5.5 %

## 2024-10-01 NOTE — PROGRESS NOTES
"Subjective   Patient ID: Krystin Mendoza is a 69 y.o. female who presents for Follow-up (\"I'm doing really well\"; doing well on lexapro; discuss lab results; ).    Anxiety: Doing well on low dose lexapro, no SE's. Maintaining her weight. Sleep is good. Completed recent labs.     eling much better, feels she is \"wonderful.\" She has worked up to the 10mg about 2 weeks ago. Sleeping better. Calmer. Some of her GI sx have improved, she is still burping, she is doing ok now on beano as needed and TUMs at lunchtime and dinnertime. Maintaining her weight. No further anxiety attacks. Vertigo sx have resolved. Back to normal activity levels. No SE's.      LLQ pain, hyponatremia: Frequent burping, LLQ \"twitters.: Going on for about 6 mo now. Had hernia repair about 2y ago. Having some challenges with appetite and early fullness. Completed recent testing. No vaginal bleeding but has questions about our plan and some interest in trying simethicone. She was taking baclofen but got dizziness during the day so she has been taking 1/2 tab at bedtime.      Left shoulder arthritis: Following with Dr. Gilman. Doing PT, did well with an injection.     Objective   /90 (BP Location: Right arm, Patient Position: Sitting, BP Cuff Size: Adult)   Pulse 76   Wt 48.2 kg (106 lb 3.2 oz)   BMI 18.23 kg/m²     Gen: No acute distress, alert and oriented x3, pleasant   HEENT: moist mucous membranes, b/l external auditory canals are clear of debris, TMs within normal limits, no oropharyngeal lesions, eomi, perrla   Neck: thyroid within normal limits, no lymphadenopathy   CV: RRR, normal S1/S2, no murmur   Resp: Clear to auscultation bilaterally, no wheezes or rhonchi appreciated  Abd: soft, nontender, non-distended, no guarding/rigidity, bowel sounds present  Extr: no edema, no calf tenderness  Derm: Skin is warm and dry, no rashes appreciated  Psych: mood is good, affect is congruent, good hygiene, normal speech and eye contact  Neuro: " cranial nerves grossly intact, normal gait    Assessment/Plan   #Anxiety  Doing very well on lexapro, no SE's     #Eructation  #Hyponatremia  #LLQ pain  CT scan negative, taking tums prn  TVUS without ovarian pathology but some changes in the uterus  Declining to trial PPI  Can try simethicone or low FODMAP diet  Re-discuss in 1 to 2 mo     #Hyponatremia  Work on sodium intake  Recheck labs     #Unexplained weight loss  Likely due to stress which has improved  Regaining weight  Monitor     #Shoulder pain  Following with Mukul   Doing well since injection     #Burping  Working on changing her calcium supplement  Recent ct chest negative for hiatal hernia  Discussed warning symptoms for GERD and she is declining currently     HCM:  Mammogram June '23 negative, will do every other year, due 2025  UTD for PNA, shingles, COVID, RSV and flu  Flu today, COVID in 1 month  Last C-scope 2015, discuss at followup

## 2024-10-07 ENCOUNTER — APPOINTMENT (OUTPATIENT)
Dept: PRIMARY CARE | Facility: CLINIC | Age: 69
End: 2024-10-07
Payer: MEDICARE

## 2024-10-07 VITALS
DIASTOLIC BLOOD PRESSURE: 80 MMHG | SYSTOLIC BLOOD PRESSURE: 131 MMHG | WEIGHT: 106.2 LBS | HEART RATE: 76 BPM | BODY MASS INDEX: 18.23 KG/M2

## 2024-10-07 DIAGNOSIS — I10 ESSENTIAL (PRIMARY) HYPERTENSION: Primary | ICD-10-CM

## 2024-10-07 DIAGNOSIS — F32.A ANXIETY AND DEPRESSION: ICD-10-CM

## 2024-10-07 DIAGNOSIS — F41.9 ANXIETY DISORDER, UNSPECIFIED TYPE: ICD-10-CM

## 2024-10-07 DIAGNOSIS — F41.9 ANXIETY AND DEPRESSION: ICD-10-CM

## 2024-10-07 DIAGNOSIS — R64 CACHEXIA (MULTI): ICD-10-CM

## 2024-10-07 DIAGNOSIS — Z23 ENCOUNTER FOR IMMUNIZATION: ICD-10-CM

## 2024-10-07 PROCEDURE — 3079F DIAST BP 80-89 MM HG: CPT | Performed by: FAMILY MEDICINE

## 2024-10-07 PROCEDURE — 3075F SYST BP GE 130 - 139MM HG: CPT | Performed by: FAMILY MEDICINE

## 2024-10-07 PROCEDURE — 1157F ADVNC CARE PLAN IN RCRD: CPT | Performed by: FAMILY MEDICINE

## 2024-10-07 PROCEDURE — 90662 IIV NO PRSV INCREASED AG IM: CPT | Performed by: FAMILY MEDICINE

## 2024-10-07 PROCEDURE — 1123F ACP DISCUSS/DSCN MKR DOCD: CPT | Performed by: FAMILY MEDICINE

## 2024-10-07 PROCEDURE — G0008 ADMIN INFLUENZA VIRUS VAC: HCPCS | Performed by: FAMILY MEDICINE

## 2024-10-07 PROCEDURE — 1036F TOBACCO NON-USER: CPT | Performed by: FAMILY MEDICINE

## 2024-10-07 PROCEDURE — 99214 OFFICE O/P EST MOD 30 MIN: CPT | Performed by: FAMILY MEDICINE

## 2024-10-07 RX ORDER — ESCITALOPRAM OXALATE 10 MG/1
10 TABLET ORAL DAILY
Qty: 90 TABLET | Refills: 3 | Status: SHIPPED | OUTPATIENT
Start: 2024-10-07 | End: 2025-10-02

## 2024-12-24 DIAGNOSIS — F41.9 ANXIETY DISORDER, UNSPECIFIED TYPE: ICD-10-CM

## 2024-12-24 RX ORDER — ESCITALOPRAM OXALATE 10 MG/1
10 TABLET ORAL DAILY
Qty: 90 TABLET | Refills: 3 | Status: SHIPPED | OUTPATIENT
Start: 2024-12-24 | End: 2025-12-19

## 2025-04-04 NOTE — PROGRESS NOTES
"Subjective   Patient ID: Krystin Mendoza is a 69 y.o. female who presents for Medicare Annual Wellness Visit Subsequent (6 months; detached retina in left eye, had surgery ~1 month ago- Dr. Reema Mcgrath in [place], she's being treated at [facility]).    Detached retina: She woke up one day feeling like there was a bubble going through the vision of her left eye and over the weekend bothered by it and went to see her eye doctor. Found to have retina detachment. [facility]gently sent down to [place] for surgery. This was a month ago. Doing well since her surgery and she has her one month followup tomorrow in [place]. She has had 2 follow ups already. Surgery with Dr. Guardado ([facility]).     Anxiety: Doing well on low dose lexapro, no SE's. Maintaining her weight. Sleep is good. Completed recent labs.      LLQ pain, hyponatremia: Frequent burping, LLQ \"twitters.: Going on for about 6 mo now. Had hernia repair about 2y ago. Having some challenges with appetite and early fullness. Completed recent testing. No vaginal bleeding but has questions about our plan and some interest in trying simethicone. She was taking baclofen but got dizziness during the day so she has been taking 1/2 tab at bedtime.      Left shoulder arthritis: Following with Dr. Gilman. Doing PT, did well with an injection.     Objective   BP (!) 159/92 (BP Location: Left arm, Patient Position: Sitting, BP Cuff Size: Adult)   Pulse 86   Ht 1.626 m (5' 4\")   Wt 48.5 kg (107 lb)   BMI 18.37 kg/m²     Gen: No acute distress, alert and oriented x3, pleasant   HEENT: moist mucous membranes, b/l external auditory canals are clear of debris, TMs within normal limits, no oropharyngeal lesions, eomi, perrla   Neck: thyroid within normal limits, no lymphadenopathy   CV: RRR, normal S1/S2, no murmur   Resp: Clear to auscultation bilaterally, no wheezes or rhonchi appreciated  Abd: soft, nontender, non-distended, no guarding/rigidity, " bowel sounds present  Extr: no edema, no calf tenderness  Derm: Skin is warm and dry, no rashes appreciated  Psych: mood is good, affect is congruent, good hygiene, normal speech and eye contact  Neuro: cranial nerves grossly intact, normal gait    No data recorded        Assessment/Plan   #Detached retina  Doing reasonably well since surgery  Has followup with ophtho surgeon in [place] tomorrow    #Anxiety  Doing very well on lexapro, no SE's     #Eructation  #Hyponatremia  #LLQ pain  CT scan negative, taking tums prn  TVUS without ovarian pathology but some changes in the uterus  Declining to trial PPI  Can try simethicone or low FODMAP diet  Sx significantly improved since her last visit     #Hyponatremia  Work on sodium intake  Recheck labs     #Shoulder pain  Following with [facility]   Doing well since injection     #Burping  Working on changing her calcium supplement  Recent ct chest negative for hiatal hernia  Discussed warning symptoms for GERD and she is declining currently     HCM:  Mammogram June '23 negative, will do every other year, due 2025  UTD for PNA, shingles, COVID, RSV and flu  Last C-scope 2015, discuss at followup

## 2025-04-07 ENCOUNTER — APPOINTMENT (OUTPATIENT)
Dept: PRIMARY CARE | Facility: CLINIC | Age: 70
End: 2025-04-07
Payer: MEDICARE

## 2025-04-07 VITALS
SYSTOLIC BLOOD PRESSURE: 133 MMHG | BODY MASS INDEX: 18.27 KG/M2 | DIASTOLIC BLOOD PRESSURE: 80 MMHG | WEIGHT: 107 LBS | HEIGHT: 64 IN | HEART RATE: 86 BPM

## 2025-04-07 DIAGNOSIS — Z12.31 ENCOUNTER FOR SCREENING MAMMOGRAM FOR MALIGNANT NEOPLASM OF BREAST: ICD-10-CM

## 2025-04-07 DIAGNOSIS — Z13.220 SCREENING FOR HYPERLIPIDEMIA: ICD-10-CM

## 2025-04-07 DIAGNOSIS — R73.09 ELEVATED GLUCOSE: ICD-10-CM

## 2025-04-07 DIAGNOSIS — E03.9 HYPOTHYROIDISM, UNSPECIFIED TYPE: Primary | ICD-10-CM

## 2025-04-07 DIAGNOSIS — Z00.00 HEALTHCARE MAINTENANCE: ICD-10-CM

## 2025-04-07 PROCEDURE — 3008F BODY MASS INDEX DOCD: CPT | Performed by: FAMILY MEDICINE

## 2025-04-07 PROCEDURE — 1157F ADVNC CARE PLAN IN RCRD: CPT | Performed by: FAMILY MEDICINE

## 2025-04-07 PROCEDURE — 1123F ACP DISCUSS/DSCN MKR DOCD: CPT | Performed by: FAMILY MEDICINE

## 2025-04-07 PROCEDURE — 1036F TOBACCO NON-USER: CPT | Performed by: FAMILY MEDICINE

## 2025-04-07 PROCEDURE — 3079F DIAST BP 80-89 MM HG: CPT | Performed by: FAMILY MEDICINE

## 2025-04-07 PROCEDURE — 1158F ADVNC CARE PLAN TLK DOCD: CPT | Performed by: FAMILY MEDICINE

## 2025-04-07 PROCEDURE — 3075F SYST BP GE 130 - 139MM HG: CPT | Performed by: FAMILY MEDICINE

## 2025-04-07 PROCEDURE — 1159F MED LIST DOCD IN RCRD: CPT | Performed by: FAMILY MEDICINE

## 2025-04-07 PROCEDURE — 1170F FXNL STATUS ASSESSED: CPT | Performed by: FAMILY MEDICINE

## 2025-04-07 PROCEDURE — G0439 PPPS, SUBSEQ VISIT: HCPCS | Performed by: FAMILY MEDICINE

## 2025-04-07 ASSESSMENT — PATIENT HEALTH QUESTIONNAIRE - PHQ9
1. LITTLE INTEREST OR PLEASURE IN DOING THINGS: NOT AT ALL
SUM OF ALL RESPONSES TO PHQ9 QUESTIONS 1 AND 2: 0
2. FEELING DOWN, DEPRESSED OR HOPELESS: NOT AT ALL

## 2025-04-07 ASSESSMENT — ACTIVITIES OF DAILY LIVING (ADL)
TAKING_MEDICATION: INDEPENDENT
MANAGING_FINANCES: INDEPENDENT
BATHING: INDEPENDENT
DOING_HOUSEWORK: INDEPENDENT
DRESSING: INDEPENDENT
GROCERY_SHOPPING: INDEPENDENT

## 2025-04-07 NOTE — PATIENT INSTRUCTIONS
Schedule an appointment for labs at [email or web address] or call [phone] 24 hours a day, 7 days a week.   You can also download the [facility] lab scheduling christine on your phone.      Radiology:  Priti:  [phone]    (June 2025)

## 2025-08-11 ENCOUNTER — APPOINTMENT (OUTPATIENT)
Dept: PRIMARY CARE | Facility: CLINIC | Age: 70
End: 2025-08-11
Payer: MEDICARE

## 2025-08-11 VITALS
DIASTOLIC BLOOD PRESSURE: 75 MMHG | SYSTOLIC BLOOD PRESSURE: 162 MMHG | WEIGHT: 105 LBS | HEIGHT: 64 IN | BODY MASS INDEX: 17.93 KG/M2 | HEART RATE: 85 BPM

## 2025-08-11 DIAGNOSIS — Z01.818 PRE-OPERATIVE CLEARANCE: Primary | ICD-10-CM

## 2025-08-11 PROCEDURE — 3078F DIAST BP <80 MM HG: CPT | Performed by: REGISTERED NURSE

## 2025-08-11 PROCEDURE — G2211 COMPLEX E/M VISIT ADD ON: HCPCS | Performed by: REGISTERED NURSE

## 2025-08-11 PROCEDURE — 3008F BODY MASS INDEX DOCD: CPT | Performed by: REGISTERED NURSE

## 2025-08-11 PROCEDURE — 1036F TOBACCO NON-USER: CPT | Performed by: REGISTERED NURSE

## 2025-08-11 PROCEDURE — 99214 OFFICE O/P EST MOD 30 MIN: CPT | Performed by: REGISTERED NURSE

## 2025-08-11 PROCEDURE — 1159F MED LIST DOCD IN RCRD: CPT | Performed by: REGISTERED NURSE

## 2025-08-11 PROCEDURE — 3077F SYST BP >= 140 MM HG: CPT | Performed by: REGISTERED NURSE

## 2025-10-06 ENCOUNTER — APPOINTMENT (OUTPATIENT)
Dept: PRIMARY CARE | Facility: CLINIC | Age: 70
End: 2025-10-06
Payer: MEDICARE

## 2025-10-13 ENCOUNTER — APPOINTMENT (OUTPATIENT)
Dept: PRIMARY CARE | Facility: CLINIC | Age: 70
End: 2025-10-13
Payer: MEDICARE